# Patient Record
Sex: MALE | Race: WHITE | Employment: FULL TIME | URBAN - METROPOLITAN AREA
[De-identification: names, ages, dates, MRNs, and addresses within clinical notes are randomized per-mention and may not be internally consistent; named-entity substitution may affect disease eponyms.]

---

## 2023-07-20 ENCOUNTER — APPOINTMENT (OUTPATIENT)
Dept: PRIMARY CARE | Facility: CLINIC | Age: 48
End: 2023-07-20
Payer: COMMERCIAL

## 2023-07-20 PROBLEM — F32.A DEPRESSION: Status: ACTIVE | Noted: 2023-07-20

## 2023-07-20 PROBLEM — M79.10 MYALGIA: Status: ACTIVE | Noted: 2023-07-20

## 2023-07-20 PROBLEM — R53.83 FATIGUE: Status: ACTIVE | Noted: 2023-07-20

## 2023-07-20 PROBLEM — L30.9 ECZEMA: Status: ACTIVE | Noted: 2023-07-20

## 2023-07-20 PROBLEM — J45.909 REACTIVE AIRWAY DISEASE (HHS-HCC): Status: ACTIVE | Noted: 2023-07-20

## 2023-07-20 RX ORDER — TRIAMCINOLONE ACETONIDE 1 MG/G
CREAM TOPICAL 2 TIMES DAILY
COMMUNITY
Start: 2020-01-21

## 2023-07-20 RX ORDER — ESCITALOPRAM OXALATE 20 MG/1
1 TABLET ORAL DAILY
COMMUNITY
Start: 2020-01-21

## 2024-09-04 ENCOUNTER — OFFICE VISIT (OUTPATIENT)
Dept: UROLOGY | Facility: CLINIC | Age: 49
End: 2024-09-04
Payer: COMMERCIAL

## 2024-09-04 ENCOUNTER — LAB (OUTPATIENT)
Dept: LAB | Facility: LAB | Age: 49
End: 2024-09-04
Payer: COMMERCIAL

## 2024-09-04 VITALS
HEART RATE: 57 BPM | BODY MASS INDEX: 30.13 KG/M2 | SYSTOLIC BLOOD PRESSURE: 141 MMHG | DIASTOLIC BLOOD PRESSURE: 86 MMHG | TEMPERATURE: 97.8 F | WEIGHT: 192 LBS | HEIGHT: 67 IN

## 2024-09-04 DIAGNOSIS — R97.20 ELEVATED PSA: Primary | ICD-10-CM

## 2024-09-04 DIAGNOSIS — R97.20 ELEVATED PSA: ICD-10-CM

## 2024-09-04 DIAGNOSIS — N52.8 OTHER MALE ERECTILE DYSFUNCTION: ICD-10-CM

## 2024-09-04 DIAGNOSIS — R53.83 LOW ENERGY: ICD-10-CM

## 2024-09-04 DIAGNOSIS — Z80.42 FAMILY HISTORY OF PROSTATE CANCER: ICD-10-CM

## 2024-09-04 PROCEDURE — 99204 OFFICE O/P NEW MOD 45 MIN: CPT | Performed by: STUDENT IN AN ORGANIZED HEALTH CARE EDUCATION/TRAINING PROGRAM

## 2024-09-04 PROCEDURE — 3008F BODY MASS INDEX DOCD: CPT | Performed by: STUDENT IN AN ORGANIZED HEALTH CARE EDUCATION/TRAINING PROGRAM

## 2024-09-04 PROCEDURE — 84154 ASSAY OF PSA FREE: CPT

## 2024-09-04 PROCEDURE — 84153 ASSAY OF PSA TOTAL: CPT

## 2024-09-04 PROCEDURE — 36415 COLL VENOUS BLD VENIPUNCTURE: CPT

## 2024-09-04 PROCEDURE — 84402 ASSAY OF FREE TESTOSTERONE: CPT

## 2024-09-04 RX ORDER — SILDENAFIL 100 MG/1
100 TABLET, FILM COATED ORAL DAILY PRN
Qty: 30 TABLET | Refills: 1 | Status: SHIPPED | OUTPATIENT
Start: 2024-09-04 | End: 2024-11-03

## 2024-09-04 NOTE — PROGRESS NOTES
Scribed for Dr. Jones Farmer by Ceasar Dumont. I, Dr. Jones Farmer have personally reviewed and agreed with the information entered by the Virtual Scribe. 09/04/24.    ASSESSMENT:  Problem List Items Addressed This Visit    None  Visit Diagnoses       Elevated PSA    -  Primary    Relevant Orders    PSA, total and free    Testosterone, free, total    Family history of prostate cancer        Relevant Orders    PSA, total and free    Testosterone, free, total    Low energy        Relevant Orders    Testosterone, free, total    Other male erectile dysfunction        Relevant Medications    sildenafil (Viagra) 100 mg tablet    Other Relevant Orders    Testosterone, free, total           PLAN:  #PSA screening  Repeat a PSA and follow up to review results.   If improved, then proceed with annual PSA screening.   If no improvement, can consider prostate MRI to assess anatomy and in anticipation of biopsy.     PSA summary:  3.5 ~ Sept 2023    #Testosterone screening  Obtain testosterone studies to establish new baseline;   Since discontinued androgel.     #Erectile dysfunction  Rx for Sildenafil (Viagra)  mg provided.  Advised on all potential side effects.  Instructed to take 1 hour prior to sex on an empty stomach.  Sexual stimulation is still required in order to work.   Reviewed utilizing [employer] for discounts and affordability.  Contraindications include avoiding nitrates and not taking this medication within 4 hours of taking tamsulosin.    Discussion:  Patient was seen today with the complaint of erectile dysfunction (ED). I went over the definition of ED, which is the inability to obtain or maintain an erection sufficient for satisfactory sexual activity. I outlined that erectile function is a complex interplay of neural vascular, hormonal and psychological factors. Disruption in any of these pathways may lead to ED. In terms of treatment options; PDE5i (Viagra, Cialis, etc.), intracavernosal injections  (ICI), vacuum erection devices (ROJELIO), and penile implants (IPP) were discussed in detail.     #Tobacco abuse  We discussed smoking and tobacco cessation for >3 minutes.  I advised this is the best thing the patient can do for their health.  Advised that cessation of tobacco can improve healing following surgery.   Patient acknowledged understanding.    All questions were answered to the patient’s satisfaction.  Patient agrees with the plan and wishes to proceed.  Continue follow-up for ongoing care of his chronic medical conditions.       History of Present Illness (HPI):  Osmin presents as a new patient for an evaluation.  The patient’s EMR has been reviewed.  Lives in [place], [place] with his Wife and 10x children.   Occupation: Employed [employer].     TODAY: (24)  Reports he has been doing well overall.   Denotes a FH of prostate cancer. (Father)  States his last PSA was somewhat elevated about 1 year ago.   Therefore, decided to establish urologic care today.     From a urinary standpoint.   Denies any bothersome urinary issues.   Stream remains strong, feels he empties well.   Nocturia, 0-1x; not bothersome.   No hx of UTI's, gross hematuria, fevers or chills.   IPSS - 4.     From a sexual health standpoint.   Endorses issues with erections.  Primarily dependent on situation.   Reports having anxiety at times related to intercourse.   Which seems to exacerbate his ED.   He still has morning erections.   Issues with erections around 2/5 times.   Tried sildenafil 20 mg in the past, up to 3x times.   Achieved partial response; seems to improve erections intermittently.   States he seemed to benefit with taking Androgel in the past.   At that time, his erections seemed better and had more energy.   LUCHO - 13 (mild/moderate ED)    Testosterone screenin,236 (elevated) ~ 2020  Was on Androgel at this time.   Not currently on any testosterone therapy.     PSA summary:  3.5 ~ 2023    PMH: Low testosterone.    PSH: Hand surgery.   FH: Prostate cancer (Father; dx in 60's)  SH: Occasional smokes tobacco (pipe) and chewing tobacco.     Past Medical History:   Diagnosis Date    Other specified health status     No pertinent past medical history     Past Surgical History:   Procedure Laterality Date    OTHER SURGICAL HISTORY  09/29/2020    Hand surgery     Family History   Problem Relation Name Age of Onset    Hypertension Father      Diabetes Father       Social History     Tobacco Use   Smoking Status Not on file   Smokeless Tobacco Not on file     Current Outpatient Medications   Medication Sig Dispense Refill    escitalopram (Lexapro) 20 mg tablet Take 1 tablet (20 mg) by mouth once daily.      sildenafil (Viagra) 100 mg tablet Take 1 tablet (100 mg) by mouth once daily as needed for erectile dysfunction. 30 tablet 1    triamcinolone (Kenalog) 0.1 % cream twice a day.       No current facility-administered medications for this visit.     No Known Allergies  Past medical, surgical, family and social history in the chart was reviewed and is accurate including any additions to what is in this HPI.    REVIEW OF SYSTEMS (ROS):   Constitutional: denies any unintentional weight loss or change in strength.  Integumentary: denies any rashes or pruritus.  Eyes: denies any double vision or eye pain.  Ear/Nose/Mouth/Throat: denies any nosebleeds or gum bleeds.  Cardiovascular: denies any chest pain or syncope.  Respiratory: denies hemoptysis.  Gastrointestinal: denies nausea or vomiting.  Musculoskeletal: denies muscle cramping or weakness.  Neurologic: denies convulsions or seizures.  Hematologic/Lymphatic: denies bleeding tendencies.  Endocrine: denies heat/cold intolerance.  All other systems have been reviewed and are negative unless otherwise noted in the HPI.     OBJECTIVE:  Visit Vitals  /86 (BP Location: Left arm, Patient Position: Sitting, BP Cuff Size: Adult)   Pulse 57   Temp 36.6 °C (97.8 °F) (Temporal)      PHYSICAL EXAM:  Constitutional: No obvious distress.  Eyes: Non-injected conjunctiva, sclera clear, EOMI.  Ears/Nose/Mouth/Throat: No obvious drainage per ears or nose.  Cardiovascular: Extremities are warm and well perfused. No edema, cyanosis or pallor.  Respiratory: No audible wheezing/stridor; respirations do not appear labored.  Gastrointestinal: Abdomen soft, not distended.  Musculoskeletal: Normal ROM of extremities.  Skin: No obvious rashes or open sores.  Neurologic: Alert and oriented, CN 2-12 grossly intact.  Psychiatric: Answers questions appropriately with normal affect.  Hematologic/Lymphatic/Immunologic: No obvious bruises or sites of spontaneous bleeding.  Genitourinary: No CVA tenderness, bladder not palpable.     LABS & IMAGING:  Lab Results   Component Value Date    WBC 6.5 08/26/2020    HGB 15.1 12/23/2020    HCT 45.6 12/23/2020     08/26/2020    ALT 18 01/21/2020    AST 18 01/21/2020     01/21/2020    K 4.3 01/21/2020     01/21/2020    CREATININE 1.16 01/21/2020    BUN 20 01/21/2020    CO2 27 01/21/2020    TSH 0.87 01/21/2020     Scribed for Dr. Jones Farmer by Ceasar Dumont.  I, Dr. Jones Farmer have personally reviewed and agreed with the information entered by the Virtual Scribe. 09/04/24.

## 2024-09-06 LAB
PSA FREE MFR SERPL: 21 %
PSA FREE SERPL-MCNC: 0.4 NG/ML
PSA SERPL IA-MCNC: 1.9 NG/ML (ref 0–4)

## 2024-09-08 LAB
TESTOSTERONE FREE (CHAN): 64.5 PG/ML (ref 35–155)
TESTOSTERONE,TOTAL,LC-MS/MS: 408 NG/DL (ref 250–1100)

## 2024-10-09 ENCOUNTER — APPOINTMENT (OUTPATIENT)
Dept: UROLOGY | Facility: CLINIC | Age: 49
End: 2024-10-09
Payer: COMMERCIAL

## 2024-10-09 VITALS
WEIGHT: 199.6 LBS | DIASTOLIC BLOOD PRESSURE: 79 MMHG | BODY MASS INDEX: 31.26 KG/M2 | TEMPERATURE: 97.8 F | SYSTOLIC BLOOD PRESSURE: 122 MMHG | HEART RATE: 56 BPM

## 2024-10-09 DIAGNOSIS — R97.20 ELEVATED PSA: ICD-10-CM

## 2024-10-09 LAB
POC APPEARANCE, URINE: CLEAR
POC BILIRUBIN, URINE: NEGATIVE
POC BLOOD, URINE: NEGATIVE
POC COLOR, URINE: YELLOW
POC GLUCOSE, URINE: NEGATIVE MG/DL
POC KETONES, URINE: NEGATIVE MG/DL
POC LEUKOCYTES, URINE: NEGATIVE
POC NITRITE,URINE: NEGATIVE
POC PH, URINE: 6.5 PH
POC PROTEIN, URINE: NEGATIVE MG/DL
POC SPECIFIC GRAVITY, URINE: 1.01
POC UROBILINOGEN, URINE: 0.2 EU/DL

## 2024-10-09 PROCEDURE — 99214 OFFICE O/P EST MOD 30 MIN: CPT | Performed by: STUDENT IN AN ORGANIZED HEALTH CARE EDUCATION/TRAINING PROGRAM

## 2024-10-09 PROCEDURE — 81003 URINALYSIS AUTO W/O SCOPE: CPT | Performed by: STUDENT IN AN ORGANIZED HEALTH CARE EDUCATION/TRAINING PROGRAM

## 2024-10-09 NOTE — PROGRESS NOTES
Scribed for Dr. Jones Farmer by Ceasar Dumont. I, Dr. Jones Farmer have personally reviewed and agreed with the information entered by the Virtual Scribe. 10/09/24.    ASSESSMENT:  Problem List Items Addressed This Visit    None  Visit Diagnoses       Elevated PSA        Relevant Orders    POCT UA Automated manually resulted (Completed)           PLAN:  #PSA screening  PSA improved compared to previous.   Continue annual PSA screening.   RTC in 1 year to review results.     PSA summary:  1.8 ~ Sept 2024  3.5 ~ Sept 2023    #Testosterone screening  Applied Androgel in the past, since discontinued.   Obtained testosterone studies to establish new baseline;   I do not recommend restarting TRT at this time.   Reports poor motivation and mood. Reports difficulty with intimacy following his divorce. I placed a referral Dr. Iyer to discuss.     Testosterone: 408    #Erectile dysfunction  Continues sildenafil PRN for treatment.   Reports a good response, no side-effects.     Advised on all potential side effects.  Instructed to take 1 hour prior to sex on an empty stomach.  Sexual stimulation is still required in order to work.   Reviewed utilizing [employer] for discounts and affordability.  Contraindications include avoiding nitrates and not taking this medication within 4 hours of taking tamsulosin.    Discussion:  Patient was seen today with the complaint of erectile dysfunction (ED). I went over the definition of ED, which is the inability to obtain or maintain an erection sufficient for satisfactory sexual activity. I outlined that erectile function is a complex interplay of neural vascular, hormonal and psychological factors. Disruption in any of these pathways may lead to ED. In terms of treatment options; PDE5i (Viagra, Cialis, etc.), intracavernosal injections (ICI), vacuum erection devices (ROJELIO), and penile implants (IPP) were discussed in detail.     #Tobacco abuse  We discussed smoking and tobacco cessation for  >3 minutes.  I advised this is the best thing the patient can do for their health.  Reports he is cutting back  Patient acknowledged understanding.    All questions were answered to the patient’s satisfaction.  Patient agrees with the plan and wishes to proceed.  Continue follow-up for ongoing care of his chronic medical conditions.       History of Present Illness (HPI):  Osmin presents for a follow up visit.   The patient’s EMR has been reviewed.  Lives in [place], [place] with his Wife and 10x children.   Occupation: Employed by [employer].     TODAY: (10/09/24)  Presents for follow up, PSA and testosterone results.   Latest lab work reviewed with patient, provided reassurance.   PSA much improved compared to prior.   Testosterone is within normal limits.     He expressed persistent issues with mood, and general fatigue.   States he has lost motivation to be active; libido remains intact.   Acknowledges multiple personal stressors.   States he is more stressed due to familial-related issues.   As well as because his Father and Brother .     TO REVIEW: Initial visit (24)  Reports he has been doing well overall.   Denotes a FH of prostate cancer. (Father)  States his last PSA was somewhat elevated about 1 year ago.   Therefore, decided to establish urologic care today.     From a urinary standpoint.   Denies any bothersome urinary issues.   Stream remains strong, feels he empties well.   Nocturia, 0-1x; not bothersome.   No hx of UTI's, gross hematuria, fevers or chills.   IPSS - 4.     From a sexual health standpoint.   Endorses issues with erections.  Primarily dependent on situation.   Reports having anxiety at times related to intercourse.   Which seems to exacerbate his ED.   He still has morning erections.   Issues with erections around 2/5 times.   Tried sildenafil 20 mg in the past, up to 3x times.   Achieved partial response; seems to improve erections intermittently.   States he seemed to benefit with taking  Androgel in the past.   At that time, his erections seemed better and had more energy.   LUCHO - 13 (mild/moderate ED)    Testosterone screenin,236 (elevated) ~ 2020  Was on Androgel at this time.   Not currently on any testosterone therapy.     PSA summary:  3.5 ~ 2023    PMH: Low testosterone.   PSH: Hand surgery.   FH: Prostate cancer (Father; dx in 60's)  SH: Occasional smokes tobacco (pipe) and chewing tobacco.     Past Medical History:   Diagnosis Date    Other specified health status     No pertinent past medical history     Past Surgical History:   Procedure Laterality Date    OTHER SURGICAL HISTORY  2020    Hand surgery     Family History   Problem Relation Name Age of Onset    Hypertension Father      Diabetes Father       Social History     Tobacco Use   Smoking Status Some Days    Types: Cigarettes   Smokeless Tobacco Current     Current Outpatient Medications   Medication Sig Dispense Refill    sildenafil (Viagra) 100 mg tablet Take 1 tablet (100 mg) by mouth once daily as needed for erectile dysfunction. 30 tablet 1    escitalopram (Lexapro) 20 mg tablet Take 1 tablet (20 mg) by mouth once daily.      triamcinolone (Kenalog) 0.1 % cream twice a day.       No current facility-administered medications for this visit.     No Known Allergies  Past medical, surgical, family and social history in the chart was reviewed and is accurate including any additions to what is in this HPI.    REVIEW OF SYSTEMS (ROS):   Constitutional: denies any unintentional weight loss or change in strength.  Integumentary: denies any rashes or pruritus.  Eyes: denies any double vision or eye pain.  Ear/Nose/Mouth/Throat: denies any nosebleeds or gum bleeds.  Cardiovascular: denies any chest pain or syncope.  Respiratory: denies hemoptysis.  Gastrointestinal: denies nausea or vomiting.  Musculoskeletal: denies muscle cramping or weakness.  Neurologic: denies convulsions or seizures.  Hematologic/Lymphatic:  denies bleeding tendencies.  Endocrine: denies heat/cold intolerance.  All other systems have been reviewed and are negative unless otherwise noted in the HPI.     OBJECTIVE:  Visit Vitals  /79   Pulse 56   Temp 36.6 °C (97.8 °F) (Temporal)     PHYSICAL EXAM:  Constitutional: No obvious distress.  Eyes: Non-injected conjunctiva, sclera clear, EOMI.  Ears/Nose/Mouth/Throat: No obvious drainage per ears or nose.  Cardiovascular: Extremities are warm and well perfused. No edema, cyanosis or pallor.  Respiratory: No audible wheezing/stridor; respirations do not appear labored.  Gastrointestinal: Abdomen soft, not distended.  Musculoskeletal: Normal ROM of extremities.  Skin: No obvious rashes or open sores.  Neurologic: Alert and oriented, CN 2-12 grossly intact.  Psychiatric: Answers questions appropriately with normal affect.  Hematologic/Lymphatic/Immunologic: No obvious bruises or sites of spontaneous bleeding.  Genitourinary: No CVA tenderness, bladder not palpable.     LABS & IMAGING:  Lab Results   Component Value Date    WBC 6.5 08/26/2020    HGB 15.1 12/23/2020    HCT 45.6 12/23/2020     08/26/2020    ALT 18 01/21/2020    AST 18 01/21/2020     01/21/2020    K 4.3 01/21/2020     01/21/2020    CREATININE 1.16 01/21/2020    BUN 20 01/21/2020    CO2 27 01/21/2020    TSH 0.87 01/21/2020    PSA 1.9 09/04/2024     Scribed for Dr. Jones Farmer by Ceasar Dumont.  I, Dr. Jones Farmer have personally reviewed and agreed with the information entered by the Virtual Scribe. 10/09/24.

## 2025-03-05 ENCOUNTER — OFFICE VISIT (OUTPATIENT)
Dept: UROLOGY | Facility: HOSPITAL | Age: 50
End: 2025-03-05
Payer: COMMERCIAL

## 2025-03-05 DIAGNOSIS — N52.8 OTHER MALE ERECTILE DYSFUNCTION: Primary | ICD-10-CM

## 2025-03-05 DIAGNOSIS — R53.83 LOW ENERGY: ICD-10-CM

## 2025-03-05 DIAGNOSIS — E29.1 TESTOSTERONE DEFICIENCY IN MALE: ICD-10-CM

## 2025-03-05 PROCEDURE — G2211 COMPLEX E/M VISIT ADD ON: HCPCS | Performed by: UROLOGY

## 2025-03-05 PROCEDURE — 99214 OFFICE O/P EST MOD 30 MIN: CPT | Performed by: UROLOGY

## 2025-03-05 PROCEDURE — 99204 OFFICE O/P NEW MOD 45 MIN: CPT | Performed by: UROLOGY

## 2025-03-05 NOTE — PROGRESS NOTES
HPI    50 y.o. male being seen with the following problem list:    Problem list:  Hypogonadism  ED - Sildenafil 100mg    9/29/20 - He was put on Androgel back in 1/2020 50mg, and his dosage has recently been reduced to 25mg in May. He still not having much success with erections. SAme issues with intercourse and masturbation. He is able to get an erection but not as rigid as it used to be and doesn't last as long as it used to. Energy levels for the most part are low. He reports issues with energy and inability to lose weight. He overall felt better back in May when his levels were higher     No smoking history. He is on Lexapro. He reports high stress levels at work.      He does note that when he takes [place] T-booster he feels better. His energy was better back in May when he was at a slightly higher dose.      He has 10 children and is not interested in having more children.     03/05/25 - Not on any TRT. Currently feeling terrible, low energy, fatigue. Has been on OTC T supplements, ineffective. Has been working on diet, exercise, weight loss, sleep, but is still not able to feel much better. Felt better on low dose TRT    T, 9/2024: 408 (64.5 free T)   T, 12/2020: 1236  T, 8/2020: 321 (67 free T)  T, 5/2020: 674 (146 free T)  T, 1/2020: 224    Lab Results   Component Value Date    PSA 1.9 09/04/2024              Current Medications:  Current Outpatient Medications   Medication Sig Dispense Refill    escitalopram (Lexapro) 20 mg tablet Take 1 tablet (20 mg) by mouth once daily.      sildenafil (Viagra) 100 mg tablet Take 1 tablet (100 mg) by mouth once daily as needed for erectile dysfunction. 30 tablet 1    triamcinolone (Kenalog) 0.1 % cream twice a day.       No current facility-administered medications for this visit.        Active Problems:  Osmin Leslie is a 50 y.o. male with the following Problems and Medications.  Patient Active Problem List   Diagnosis    Depression    Eczema    Fatigue    Myalgia     Reactive airway disease ([facility])     Current Outpatient Medications   Medication Sig Dispense Refill    escitalopram (Lexapro) 20 mg tablet Take 1 tablet (20 mg) by mouth once daily.      sildenafil (Viagra) 100 mg tablet Take 1 tablet (100 mg) by mouth once daily as needed for erectile dysfunction. 30 tablet 1    triamcinolone (Kenalog) 0.1 % cream twice a day.       No current facility-administered medications for this visit.       PMH:  Past Medical History:   Diagnosis Date    Other specified health status     No pertinent past medical history       PSH:  Past Surgical History:   Procedure Laterality Date    OTHER SURGICAL HISTORY  09/29/2020    Hand surgery       FMH:  Family History   Problem Relation Name Age of Onset    Hypertension Father      Diabetes Father         SHx:  Social History     Tobacco Use    Smoking status: Some Days     Types: Cigarettes    Smokeless tobacco: Current       Allergies:  No Known Allergies      Assessment/Plan  Low-normal T from 9/2024, but continue to be symptomatic; low energy, fatigue, feels terrible. I recommend re-checking labs; T, Estrogen, FSH, LH. If still in the low-normal category, reasonable to treat with low dose TRT and see how he feels, would monitor T levels to make sure he isnt getting too high.     FU in 2 weeks with labs prior, advise to get this before 9 am.     Scribe Attestation  By signing my name below, I, Rajan Jurado, attest that this documentation has been prepared under the direction and in the presence of Herman Nguyen MD.

## 2025-03-06 ENCOUNTER — OFFICE VISIT (OUTPATIENT)
Dept: URGENT CARE | Age: 50
End: 2025-03-06
Payer: COMMERCIAL

## 2025-03-06 VITALS
DIASTOLIC BLOOD PRESSURE: 79 MMHG | OXYGEN SATURATION: 99 % | RESPIRATION RATE: 14 BRPM | TEMPERATURE: 97.6 F | SYSTOLIC BLOOD PRESSURE: 134 MMHG | HEART RATE: 59 BPM

## 2025-03-06 DIAGNOSIS — J06.9 UPPER RESPIRATORY TRACT INFECTION, UNSPECIFIED TYPE: Primary | ICD-10-CM

## 2025-03-06 PROCEDURE — 99203 OFFICE O/P NEW LOW 30 MIN: CPT | Performed by: FAMILY MEDICINE

## 2025-03-06 RX ORDER — IPRATROPIUM BROMIDE 42 UG/1
2 SPRAY, METERED NASAL 3 TIMES DAILY
Qty: 15 ML | Refills: 0 | Status: SHIPPED | OUTPATIENT
Start: 2025-03-06 | End: 2025-03-11

## 2025-03-06 RX ORDER — LORATADINE AND PSEUDOEPHEDRINE SULFATE 5; 120 MG/1; MG/1
1 TABLET, EXTENDED RELEASE ORAL 2 TIMES DAILY
Qty: 20 TABLET | Refills: 0 | Status: SHIPPED | OUTPATIENT
Start: 2025-03-06 | End: 2026-03-06

## 2025-03-06 RX ORDER — AMOXICILLIN AND CLAVULANATE POTASSIUM 875; 125 MG/1; MG/1
875 TABLET, FILM COATED ORAL 2 TIMES DAILY
Qty: 20 TABLET | Refills: 0 | Status: SHIPPED | OUTPATIENT
Start: 2025-03-06

## 2025-03-06 ASSESSMENT — ENCOUNTER SYMPTOMS: SINUS COMPLAINT: 1

## 2025-03-06 NOTE — LETTER
March 6, 2025     Patient: Osmin Leslie   YOB: 1975   Date of Visit: 3/6/2025       To Whom It May Concern:    Osmin Leslie was seen in my clinic on 3/6/2025 at 8:50 am. Please excuse Osmin for his absence from work on this day to make the appointment.    If you have any questions or concerns, please don't hesitate to call.         Sincerely,         Michael Bonner MD        CC: No Recipients

## 2025-03-06 NOTE — PROGRESS NOTES
Subjective   Patient ID: Osmin Leslie is a 50 y.o. male. They present today with a chief complaint of Sinus Problem and Ear Problem.    History of Present Illness  50-year-old male presents with 1 week of stuffy nose runny nose sore throat.  Dry cough is just worse in the morning, clears up as the day goes on.  No fever, chills, some malaise.  Not right ear pain.  No nausea, vomit, diarrhea, no rash, has not been on any medicines to treat his symptoms.  one of his daughters and ex-girlfriend are sick with similar symptoms as well      Sinus Problem      Past Medical History  Allergies as of 03/06/2025    (No Known Allergies)       (Not in a hospital admission)       Past Medical History:   Diagnosis Date    Other specified health status     No pertinent past medical history       Past Surgical History:   Procedure Laterality Date    OTHER SURGICAL HISTORY  09/29/2020    Hand surgery        reports that he has been smoking cigarettes. He uses smokeless tobacco.    Review of Systems  Review of Systems                               Objective    Vitals:    03/06/25 0838   BP: 134/79   Pulse: 59   Resp: 14   Temp: 36.4 °C (97.6 °F)   SpO2: 99%     No LMP for male patient.    Physical Exam    General- No apparent distress, well appearing  Cardiovascular- regular rate and rhythm, no gallops, murmurs or rubs  Lungs- clear to auscultation bilaterally, no wheezing or rhonchi  ENT- Normal bilateral tympanic membranes, normal pharynx  Skin- no rashes noted      Procedures    Point of Care Test & Imaging Results from this visit  No results found for this visit on 03/06/25.   No results found.    Diagnostic study results (if any) were reviewed by Michael Bonner MD.    Assessment/Plan   Allergies, medications, history, and pertinent labs/EKGs/Imaging reviewed by Michael Bonner MD.     Medical Decision Making    1.  Congestion- suspect simple URI.  Advised Claritin-D and ipratropium nasal spray.  If not improved in 48 to 72  hours start Augmentin for possible evolution to bacterial sinusitis    Orders and Diagnoses  There are no diagnoses linked to this encounter.    Medical Admin Record      Patient disposition: Home    Electronically signed by Michael Bonner MD  8:47 AM

## 2025-03-07 LAB
ESTRADIOL SERPL-MCNC: 22 PG/ML
FSH SERPL-ACNC: 8 MIU/ML (ref 1.4–12.8)
LH SERPL-ACNC: 3.2 MIU/ML (ref 1.5–9.3)
PROLACTIN SERPL-MCNC: 8.2 NG/ML (ref 2–18)
TESTOST SERPL-MCNC: 269 NG/DL (ref 250–827)

## 2025-03-20 NOTE — PROGRESS NOTES
HPI    50 y.o. male being seen with the following problem list:    Problem list:  Hypogonadism  ED - Sildenafil 100mg     9/29/20 - He was put on Androgel back in 1/2020 50mg, and his dosage has recently been reduced to 25mg in May. He still not having much success with erections. SAme issues with intercourse and masturbation. He is able to get an erection but not as rigid as it used to be and doesn't last as long as it used to. Energy levels for the most part are low. He reports issues with energy and inability to lose weight. He overall felt better back in May when his levels were higher     No smoking history. He is on Lexapro. He reports high stress levels at work.      He does note that when he takes [place] T-booster he feels better. His energy was better back in May when he was at a slightly higher dose.      He has 10 children and is not interested in having more children.      03/05/25 - Not on any TRT. Currently feeling terrible, low energy, fatigue. Has been on OTC T supplements, ineffective. Has been working on diet, exercise, weight loss, sleep, but is still not able to feel much better. Felt better on low dose TRT     T, 9/2024: 408 (64.5 free T)   T, 12/2020: 1236  T, 8/2020: 321 (67 free T)  T, 5/2020: 674 (146 free T)  T, 1/2020: 224    3/6/25 Labs  T - 269  EST - 22  FSH - 8.0  LH - 3.2    03/20/25 - Seen today over telehealth, performed this visit using real-time telehealth tools, including an audio/video connection between Osmin Leslie at home and Herman Nguyen MD at [facility]. Consent for telemedicine visit was obtained.       Lab Results   Component Value Date    PSA 1.9 09/04/2024              Current Medications:  Current Outpatient Medications   Medication Sig Dispense Refill    amoxicillin-pot clavulanate (Augmentin) 875-125 mg tablet Take 1 tablet (875 mg) by mouth 2 times a day. 20 tablet 0    escitalopram (Lexapro) 20 mg tablet Take 1 tablet (20 mg) by mouth once daily.  (Patient not taking: Reported on 3/6/2025)      ipratropium (Atrovent) 42 mcg (0.06 %) nasal spray Administer 2 sprays into each nostril 3 times a day for 5 days. 15 mL 0    loratadine-pseudoephedrine (Claritin-D 12 Hour) 5-120 mg 12 hr tablet Take 1 tablet by mouth 2 times a day. Do not crush, chew, or split. 20 tablet 0    sildenafil (Viagra) 100 mg tablet Take 1 tablet (100 mg) by mouth once daily as needed for erectile dysfunction. 30 tablet 1    triamcinolone (Kenalog) 0.1 % cream twice a day. (Patient not taking: Reported on 3/6/2025)       No current facility-administered medications for this visit.        Active Problems:  Osmin Leslie is a 50 y.o. male with the following Problems and Medications.  Patient Active Problem List   Diagnosis    Depression    Eczema    Fatigue    Myalgia    Reactive airway disease ([facility])     Current Outpatient Medications   Medication Sig Dispense Refill    amoxicillin-pot clavulanate (Augmentin) 875-125 mg tablet Take 1 tablet (875 mg) by mouth 2 times a day. 20 tablet 0    escitalopram (Lexapro) 20 mg tablet Take 1 tablet (20 mg) by mouth once daily. (Patient not taking: Reported on 3/6/2025)      ipratropium (Atrovent) 42 mcg (0.06 %) nasal spray Administer 2 sprays into each nostril 3 times a day for 5 days. 15 mL 0    loratadine-pseudoephedrine (Claritin-D 12 Hour) 5-120 mg 12 hr tablet Take 1 tablet by mouth 2 times a day. Do not crush, chew, or split. 20 tablet 0    sildenafil (Viagra) 100 mg tablet Take 1 tablet (100 mg) by mouth once daily as needed for erectile dysfunction. 30 tablet 1    triamcinolone (Kenalog) 0.1 % cream twice a day. (Patient not taking: Reported on 3/6/2025)       No current facility-administered medications for this visit.       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    We extensively reviewed the AUA guidelines on counseling for testosterone replacement therapy, discussed all the attendant risks and benefits as per the guideline. He would like to proceed.    Will start on Testosterone cypionate 100mg IM weekly. Will set him up with med, needles, syrings, and a teaching appt with one of our nurses in [place] to be closer to his house.    Fu 8 weeks over thv with midcycle T, hematocrit, symptom check.    Scribe Attestation  By signing my name below, I, Nils Martinez, Scribe, attest that this documentation has been prepared under the direction and in the presence of Herman Nguyen MD.

## 2025-03-21 ENCOUNTER — APPOINTMENT (OUTPATIENT)
Dept: UROLOGY | Facility: HOSPITAL | Age: 50
End: 2025-03-21
Payer: COMMERCIAL

## 2025-03-21 DIAGNOSIS — N40.1 BENIGN PROSTATIC HYPERPLASIA WITH LOWER URINARY TRACT SYMPTOMS, SYMPTOM DETAILS UNSPECIFIED: ICD-10-CM

## 2025-03-21 DIAGNOSIS — R97.20 ELEVATED PSA: ICD-10-CM

## 2025-03-21 DIAGNOSIS — E29.1 TESTOSTERONE DEFICIENCY IN MALE: Primary | ICD-10-CM

## 2025-03-21 DIAGNOSIS — E29.1 HYPOGONADISM MALE: ICD-10-CM

## 2025-03-21 PROCEDURE — 99214 OFFICE O/P EST MOD 30 MIN: CPT | Performed by: UROLOGY

## 2025-03-21 PROCEDURE — G2211 COMPLEX E/M VISIT ADD ON: HCPCS | Performed by: UROLOGY

## 2025-03-21 RX ORDER — TESTOSTERONE CYPIONATE 1000 MG/10ML
100 INJECTION, SOLUTION INTRAMUSCULAR WEEKLY
Qty: 5 ML | Refills: 4 | Status: SHIPPED | OUTPATIENT
Start: 2025-03-21 | End: 2025-09-17

## 2025-03-21 RX ORDER — SYRINGE W-NEEDLE,DISPOSAB,3 ML 25GX5/8"
1 SYRINGE, EMPTY DISPOSABLE MISCELLANEOUS WEEKLY
Qty: 16 EACH | Refills: 2 | Status: SHIPPED | OUTPATIENT
Start: 2025-03-21 | End: 2025-06-19

## 2025-04-08 ENCOUNTER — APPOINTMENT (OUTPATIENT)
Dept: UROLOGY | Facility: CLINIC | Age: 50
End: 2025-04-08
Payer: COMMERCIAL

## 2025-04-22 ENCOUNTER — OFFICE VISIT (OUTPATIENT)
Dept: URGENT CARE | Age: 50
End: 2025-04-22
Payer: COMMERCIAL

## 2025-04-22 VITALS
BODY MASS INDEX: 31.17 KG/M2 | TEMPERATURE: 99.1 F | OXYGEN SATURATION: 97 % | DIASTOLIC BLOOD PRESSURE: 88 MMHG | SYSTOLIC BLOOD PRESSURE: 137 MMHG | RESPIRATION RATE: 17 BRPM | HEART RATE: 105 BPM | WEIGHT: 199 LBS

## 2025-04-22 DIAGNOSIS — Z20.822 CLOSE EXPOSURE TO COVID-19 VIRUS: ICD-10-CM

## 2025-04-22 DIAGNOSIS — K04.7 DENTAL INFECTION: Primary | ICD-10-CM

## 2025-04-22 LAB
POC CORONAVIRUS SARS-COV-2 PCR: NEGATIVE
POC HUMAN RHINOVIRUS PCR: NEGATIVE
POC INFLUENZA A VIRUS PCR: NEGATIVE
POC INFLUENZA B VIRUS PCR: NEGATIVE
POC RESPIRATORY SYNCYTIAL VIRUS PCR: NEGATIVE

## 2025-04-22 PROCEDURE — 99214 OFFICE O/P EST MOD 30 MIN: CPT

## 2025-04-22 PROCEDURE — 87631 RESP VIRUS 3-5 TARGETS: CPT

## 2025-04-22 PROCEDURE — 99070 SPECIAL SUPPLIES PHYS/QHP: CPT

## 2025-04-22 RX ORDER — AMOXICILLIN AND CLAVULANATE POTASSIUM 875; 125 MG/1; MG/1
875 TABLET, FILM COATED ORAL 2 TIMES DAILY
Qty: 20 TABLET | Refills: 0 | Status: SHIPPED | OUTPATIENT
Start: 2025-04-22

## 2025-04-22 ASSESSMENT — ENCOUNTER SYMPTOMS
LOSS OF CONSCIOUSNESS: 0
CHILLS: 1
VOMITING: 0
FATIGUE: 0
DIARRHEA: 0
RHINORRHEA: 0
SINUS PAIN: 0
SORE THROAT: 0
COUGH: 0
ADENOPATHY: 0
WHEEZING: 0
HEADACHES: 0
SINUS PRESSURE: 0
FEVER: 0
SHORTNESS OF BREATH: 0
ABDOMINAL PAIN: 0
MYALGIAS: 1
NAUSEA: 0

## 2025-04-22 NOTE — PROGRESS NOTES
Subjective   Patient ID: Osmin Leslie is a 50 y.o. male. They present today with a chief complaint of Illness (Chills, aches x less then one day).    History of Present Illness  50 year old male presents with complaint of chills and sweats. Reports he broke a tooth last week which was painful at first. Denies cough, sinus pain, fever, wheezing, ear pain, foul taste in mouth, sore throat. Symptoms started yesterday.       Illness  Severity:  Moderate  Onset quality:  Sudden  Duration:  1 day  Timing:  Constant  Progression:  Unchanged  Worsened by:  Nothing tried  Associated symptoms: myalgias    Associated symptoms: no abdominal pain, no chest pain, no congestion, no cough, no diarrhea, no ear pain, no fatigue, no fever, no headaches, no loss of consciousness, no nausea, no rash, no rhinorrhea, no shortness of breath, no sore throat, no vomiting and no wheezing        Past Medical History  Allergies as of 04/22/2025    (No Known Allergies)       Prescriptions Prior to Admission[1]     Medical History[2]    Surgical History[3]     reports that he has been smoking cigarettes. He uses smokeless tobacco.    Review of Systems  Review of Systems   Constitutional:  Positive for chills. Negative for fatigue and fever.   HENT:  Positive for dental problem. Negative for congestion, drooling, ear pain, postnasal drip, rhinorrhea, sinus pressure, sinus pain and sore throat.    Respiratory:  Negative for cough, shortness of breath and wheezing.    Cardiovascular:  Negative for chest pain.   Gastrointestinal:  Negative for abdominal pain, diarrhea, nausea and vomiting.   Musculoskeletal:  Positive for myalgias.   Skin:  Negative for rash.   Neurological:  Negative for loss of consciousness and headaches.   Hematological:  Negative for adenopathy.   All other systems reviewed and are negative.        Objective    Vitals:    04/22/25 1748   BP: 137/88   Pulse: 105   Resp: 17   Temp: 37.3 °C (99.1 °F)   SpO2: 97%   Weight: 90.3 kg  (199 lb)     No LMP for male patient.    Physical Exam  Vitals and nursing note reviewed.   Constitutional:       General: He is not in acute distress.     Appearance: Normal appearance. He is not ill-appearing.   HENT:      Head: Normocephalic.      Right Ear: Tympanic membrane, ear canal and external ear normal.      Left Ear: Tympanic membrane, ear canal and external ear normal.      Nose: Rhinorrhea present. No congestion.      Right Sinus: No maxillary sinus tenderness or frontal sinus tenderness.      Left Sinus: No maxillary sinus tenderness or frontal sinus tenderness.      Mouth/Throat:      Mouth: Mucous membranes are moist. No oral lesions.      Dentition: Dental tenderness and dental caries present. No dental abscesses.      Pharynx: No oropharyngeal exudate, posterior oropharyngeal erythema or uvula swelling.      Tonsils: No tonsillar exudate or tonsillar abscesses.     Eyes:      Conjunctiva/sclera: Conjunctivae normal.      Pupils: Pupils are equal, round, and reactive to light.   Cardiovascular:      Rate and Rhythm: Normal rate and regular rhythm.      Pulses: Normal pulses.      Heart sounds: Normal heart sounds. No murmur heard.  Pulmonary:      Effort: Pulmonary effort is normal. No respiratory distress.      Breath sounds: Normal breath sounds.   Musculoskeletal:         General: Normal range of motion.      Cervical back: Normal range of motion and neck supple.   Skin:     General: Skin is warm and dry.   Neurological:      Mental Status: He is alert and oriented to person, place, and time.   Psychiatric:         Mood and Affect: Mood normal.         Behavior: Behavior normal.         Procedures    Point of Care Test & Imaging Results from this visit  Results for orders placed or performed in visit on 04/22/25   POCT SPOTFIRE R/ST Panel Mini w/COVID ([facility]) manually resulted    Specimen: Swab   Result Value Ref Range    POC Sars-Cov-2 PCR Negative Negative    POC Respiratory Syncytial  Virus PCR Negative Negative    POC Influenza A Virus PCR Negative Negative    POC Influenza B Virus PCR Negative Negative    POC Human Rhinovirus PCR Negative Negative      Imaging  No results found.    Cardiology, Vascular, and Other Imaging  No other imaging results found for the past 2 days      Diagnostic study results (if any) were reviewed by NATHAN Whitehead.    Assessment/Plan   Allergies, medications, history, and pertinent labs/EKGs/Imaging reviewed by NATHAN Whitehead.     Medical Decision Making  Signs and symptoms consistent with dental infection. No evidence of severe abscess requiring drainage. Symptoms and exam are NOT suggestive of PTA or Ludwigs angina. Will cover with antibiotics and patient will follow with dentist for further evaluation and care. Supportive measures for pain. Advised to present to ED if symptoms change or worsen.  Patient verbalized understanding and agreeable with plan of care.     Orders and Diagnoses  Diagnoses and all orders for this visit:  Close exposure to COVID-19 virus  -     POCT SPOTFIRE R/ST Panel Mini w/COVID ([facility]) manually resulted      Medical Admin Record      Patient disposition: Home    Electronically signed by NATHAN Whitehead  6:24 PM           [1] (Not in a hospital admission)   [2]   Past Medical History:  Diagnosis Date    Other specified health status     No pertinent past medical history   [3]   Past Surgical History:  Procedure Laterality Date    OTHER SURGICAL HISTORY  09/29/2020    Hand surgery

## 2025-05-20 LAB
HCT VFR BLD AUTO: 43.5 % (ref 38.5–50)
PSA SERPL-MCNC: 1.84 NG/ML
TESTOST SERPL-MCNC: 244 NG/DL (ref 250–827)

## 2025-05-21 ENCOUNTER — TELEMEDICINE (OUTPATIENT)
Dept: UROLOGY | Facility: HOSPITAL | Age: 50
End: 2025-05-21
Payer: COMMERCIAL

## 2025-05-21 DIAGNOSIS — E29.1 HYPOGONADISM MALE: ICD-10-CM

## 2025-05-21 DIAGNOSIS — R53.83 LOW ENERGY: Primary | ICD-10-CM

## 2025-05-21 PROCEDURE — 99214 OFFICE O/P EST MOD 30 MIN: CPT | Performed by: UROLOGY

## 2025-05-21 RX ORDER — TESTOSTERONE CYPIONATE 1000 MG/10ML
100 INJECTION, SOLUTION INTRAMUSCULAR WEEKLY
Qty: 5 ML | Refills: 4 | Status: SHIPPED | OUTPATIENT
Start: 2025-05-21 | End: 2025-11-17

## 2025-05-21 NOTE — PROGRESS NOTES
HPI    Virtual or Telephone Consent    An interactive audio and video telecommunication system which permits real time communications between the patient (at the originating site) and provider (at the distant site) was utilized to provide this telehealth service.   Verbal consent was requested and obtained from Osmin Leslie on this date, 05/21/25 for a telehealth visit and the patient's location was confirmed at the time of the visit.    50 y.o. male being seen with the following problem list:    Problem list:  Hypogonadism  ED - Sildenafil 100mg     9/29/20 - He was put on Androgel back in 1/2020 50mg, and his dosage has recently been reduced to 25mg in May. He still not having much success with erections. SAme issues with intercourse and masturbation. He is able to get an erection but not as rigid as it used to be and doesn't last as long as it used to. Energy levels for the most part are low. He reports issues with energy and inability to lose weight. He overall felt better back in May when his levels were higher     No smoking history. He is on Lexapro. He reports high stress levels at work.      He does note that when he takes [place] T-booster he feels better. His energy was better back in May when he was at a slightly higher dose.      He has 10 children and is not interested in having more children.      03/05/25 - Not on any TRT. Currently feeling terrible, low energy, fatigue. Has been on OTC T supplements, ineffective. Has been working on diet, exercise, weight loss, sleep, but is still not able to feel much better. Felt better on low dose TRT     T, 9/2024: 408 (64.5 free T)   T, 12/2020: 1236  T, 8/2020: 321 (67 free T)  T, 5/2020: 674 (146 free T)  T, 1/2020: 224     3/6/25 Labs  T - 269  EST - 22  FSH - 8.0  LH - 3.2     03/20/25 - Seen today over telehealth, performed this visit using real-time telehealth tools, including an audio/video connection between Osmin Leslie at home and Herman Nguyen MD  "at [facility]. Consent for telemedicine visit was obtained.      Lab Results from 05/19/25 - Hematocrit 43.5; Testosterone 244; PSA 1.84.     05/21/25 - the patient started testosterone shots but admits he got off schedule with the injections. Labs were done >1 week after his injection. Patient is feeling a little better. Patient has trouble maintaining an erection.     Lab Results   Component Value Date    PSA 1.84 05/19/2025    PSA 1.9 09/04/2024         Current Medications:  Current Medications[1]     Active Problems:  Osmin Leslie is a 50 y.o. male with the following Problems and Medications.  Problem List[2]  Current Medications[3]    PMH:  Medical History[4]    PSH:  Surgical History[5]    FMH:  Family History[6]    SHx:  Social History[7]    Allergies:  RX Allergies[8]      Assessment/Plan  Patient will get labs done 3 to 4 days after his injection. Will reorder testosterone cypionate. Order T level for midcycle. Follow-up in 1 month with results.         Scribe Attestation  By signing my name below, I, Kenya Soares , Scribnorma   attest that this documentation has been prepared under the direction and in the presence of Herman Nguyen MD.         [1]   Current Outpatient Medications   Medication Sig Dispense Refill    amoxicillin-clavulanate (Augmentin) 875-125 mg tablet Take 1 tablet (875 mg) by mouth 2 times a day. 20 tablet 0    escitalopram (Lexapro) 20 mg tablet Take 1 tablet (20 mg) by mouth once daily. (Patient not taking: Reported on 3/6/2025)      loratadine-pseudoephedrine (Claritin-D 12 Hour) 5-120 mg 12 hr tablet Take 1 tablet by mouth 2 times a day. Do not crush, chew, or split. 20 tablet 0    needle, disp, 18 G 18 gauge x 1 1/2\" needle Use for medication draw 16 each 3    sildenafil (Viagra) 100 mg tablet Take 1 tablet (100 mg) by mouth once daily as needed for erectile dysfunction. 30 tablet 1    syringe with needle (Carepoint Luer Lock Syr-needle) 3 mL 22 x 1 1/2\" syringe 1 each 1 " "(one) time per week. 16 each 2    testosterone cypionate (Depo-Testosterone) 100 mg/mL injection Inject 1 mL (100 mg) into the muscle 1 (one) time per week. 5 mL 4     No current facility-administered medications for this visit.   [2]   Patient Active Problem List  Diagnosis    Depression    Eczema    Fatigue    Myalgia    Reactive airway disease ([facility])   [3]   Current Outpatient Medications   Medication Sig Dispense Refill    amoxicillin-clavulanate (Augmentin) 875-125 mg tablet Take 1 tablet (875 mg) by mouth 2 times a day. 20 tablet 0    escitalopram (Lexapro) 20 mg tablet Take 1 tablet (20 mg) by mouth once daily. (Patient not taking: Reported on 3/6/2025)      loratadine-pseudoephedrine (Claritin-D 12 Hour) 5-120 mg 12 hr tablet Take 1 tablet by mouth 2 times a day. Do not crush, chew, or split. 20 tablet 0    needle, disp, 18 G 18 gauge x 1 1/2\" needle Use for medication draw 16 each 3    sildenafil (Viagra) 100 mg tablet Take 1 tablet (100 mg) by mouth once daily as needed for erectile dysfunction. 30 tablet 1    syringe with needle (Carepoint Luer Lock Syr-needle) 3 mL 22 x 1 1/2\" syringe 1 each 1 (one) time per week. 16 each 2    testosterone cypionate (Depo-Testosterone) 100 mg/mL injection Inject 1 mL (100 mg) into the muscle 1 (one) time per week. 5 mL 4     No current facility-administered medications for this visit.   [4]   Past Medical History:  Diagnosis Date    Other specified health status     No pertinent past medical history   [5]   Past Surgical History:  Procedure Laterality Date    OTHER SURGICAL HISTORY  09/29/2020    Hand surgery   [6]   Family History  Problem Relation Name Age of Onset    Hypertension Father      Diabetes Father     [7]   Social History  Tobacco Use    Smoking status: Some Days     Types: Cigarettes    Smokeless tobacco: Current   [8] No Known Allergies    "

## 2025-05-22 ENCOUNTER — APPOINTMENT (OUTPATIENT)
Dept: UROLOGY | Facility: HOSPITAL | Age: 50
End: 2025-05-22
Payer: COMMERCIAL

## 2025-06-25 LAB — TESTOST SERPL-MCNC: 819 NG/DL (ref 250–827)

## 2025-06-30 NOTE — PROGRESS NOTES
HPI    50 y.o. male being seen with the following problem list:    Problem list:  Hypogonadism  ED - Sildenafil 100mg     9/29/20 - He was put on Androgel back in 1/2020 50mg, and his dosage has recently been reduced to 25mg in May. He still not having much success with erections. SAme issues with intercourse and masturbation. He is able to get an erection but not as rigid as it used to be and doesn't last as long as it used to. Energy levels for the most part are low. He reports issues with energy and inability to lose weight. He overall felt better back in May when his levels were higher     No smoking history. He is on Lexapro. He reports high stress levels at work.      He does note that when he takes [place] T-booster he feels better. His energy was better back in May when he was at a slightly higher dose.      He has 10 children and is not interested in having more children.      03/05/25 - Not on any TRT. Currently feeling terrible, low energy, fatigue. Has been on OTC T supplements, ineffective. Has been working on diet, exercise, weight loss, sleep, but is still not able to feel much better. Felt better on low dose TRT     T, 9/2024: 408 (64.5 free T)   T, 12/2020: 1236  T, 8/2020: 321 (67 free T)  T, 5/2020: 674 (146 free T)  T, 1/2020: 224     3/6/25 Labs  T - 269  EST - 22  FSH - 8.0  LH - 3.2     03/20/25 - Seen today over telehealth, performed this visit using real-time telehealth tools, including an audio/video connection between Osmin Leslie at home and Herman Nguyen MD at [facility]. Consent for telemedicine visit was obtained.      Lab Results from 05/19/25 - Hematocrit 43.5; Testosterone 244; PSA 1.84.      05/21/25 - the patient started testosterone shots but admits he got off schedule with the injections. Labs were done >1 week after his injection. Patient is feeling a little better. Patient has trouble maintaining an erection.     6/2025 T - 819    07/02/25 - Seen today over  "telehealth, performed this visit using real-time telehealth tools, including an audio/video connection between Osmin Leslie at home and Herman Nguyen MD at [facility]. Consent for telemedicine visit was obtained.   Feeling great on TRT. Issues maintaining erection, no issues with obtaining one. Notes some new issues with acne      Lab Results   Component Value Date    PSA 1.84 05/19/2025    PSA 1.9 09/04/2024              Current Medications:  Current Medications[1]     Active Problems:  Osmin Leslie is a 50 y.o. male with the following Problems and Medications.  Problem List[2]  Current Medications[3]    PMH:  Medical History[4]    PSH:  Surgical History[5]    FMH:  Family History[6]    SHx:  Social History[7]    Allergies:  RX Allergies[8]      Assessment/Plan  Symptomatically doing well on Testosterone cypionate 100mg IM weekly, labs look great. Advise to cut down to 3/4 of ml, instead of a ml, given acne. Will re-check T, hmtcrt in 3 months. Rx refilled.     ED, issues maintaining erections. Will order low dose Cialis 5mg for him. Reviewed s/e and contraindication to nitrates.     FU in 3 months with labs prior.     Scribe Attestation  By signing my name below, I, Nils Martinez, Edwaribnorma, attest that this documentation has been prepared under the direction and in the presence of Herman Nguyen MD.           [1]   Current Outpatient Medications   Medication Sig Dispense Refill    amoxicillin-clavulanate (Augmentin) 875-125 mg tablet Take 1 tablet (875 mg) by mouth 2 times a day. 20 tablet 0    escitalopram (Lexapro) 20 mg tablet Take 1 tablet (20 mg) by mouth once daily. (Patient not taking: Reported on 3/6/2025)      loratadine-pseudoephedrine (Claritin-D 12 Hour) 5-120 mg 12 hr tablet Take 1 tablet by mouth 2 times a day. Do not crush, chew, or split. 20 tablet 0    needle, disp, 18 G 18 gauge x 1 1/2\" needle Use for medication draw 16 each 3    tadalafil (Cialis) 5 mg tablet Take 1 tablet (5 mg) by " "mouth once daily. 30 tablet 11    testosterone cypionate (Depo-Testosterone) 100 mg/mL injection Inject 1 mL (100 mg) into the muscle 1 (one) time per week. 5 mL 4     No current facility-administered medications for this visit.   [2]   Patient Active Problem List  Diagnosis    Depression    Eczema    Fatigue    Myalgia    Reactive airway disease ([facility])   [3]   Current Outpatient Medications   Medication Sig Dispense Refill    amoxicillin-clavulanate (Augmentin) 875-125 mg tablet Take 1 tablet (875 mg) by mouth 2 times a day. 20 tablet 0    escitalopram (Lexapro) 20 mg tablet Take 1 tablet (20 mg) by mouth once daily. (Patient not taking: Reported on 3/6/2025)      loratadine-pseudoephedrine (Claritin-D 12 Hour) 5-120 mg 12 hr tablet Take 1 tablet by mouth 2 times a day. Do not crush, chew, or split. 20 tablet 0    needle, disp, 18 G 18 gauge x 1 1/2\" needle Use for medication draw 16 each 3    tadalafil (Cialis) 5 mg tablet Take 1 tablet (5 mg) by mouth once daily. 30 tablet 11    testosterone cypionate (Depo-Testosterone) 100 mg/mL injection Inject 1 mL (100 mg) into the muscle 1 (one) time per week. 5 mL 4     No current facility-administered medications for this visit.   [4]   Past Medical History:  Diagnosis Date    Erectile dysfunction     Other specified health status     No pertinent past medical history   [5]   Past Surgical History:  Procedure Laterality Date    OTHER SURGICAL HISTORY  09/29/2020    Hand surgery   [6]   Family History  Problem Relation Name Age of Onset    Hypertension Father      Diabetes Father      Prostate cancer Father's Brother Tutu Leslie Jr     Prostate cancer Father's Brother Tutu Leslie Jr     Prostate cancer Father's Brother Tutu Lelsie Jr    [7]   Social History  Tobacco Use    Smoking status: Every Day     Types: Cigarettes, Pipe    Smokeless tobacco: Current     Types: Chew    Tobacco comments:     Began chewing again during divorce   Substance Use Topics    Alcohol " use: Not Currently     Comment: 1 or 2 very rarely    Drug use: Never   [8] No Known Allergies

## 2025-07-02 ENCOUNTER — TELEMEDICINE (OUTPATIENT)
Dept: UROLOGY | Facility: HOSPITAL | Age: 50
End: 2025-07-02
Payer: COMMERCIAL

## 2025-07-02 DIAGNOSIS — E29.1 HYPOGONADISM MALE: ICD-10-CM

## 2025-07-02 DIAGNOSIS — N52.8 OTHER MALE ERECTILE DYSFUNCTION: ICD-10-CM

## 2025-07-02 PROCEDURE — 99214 OFFICE O/P EST MOD 30 MIN: CPT | Performed by: UROLOGY

## 2025-07-02 RX ORDER — TADALAFIL 5 MG/1
5 TABLET ORAL DAILY
Qty: 30 TABLET | Refills: 11 | Status: SHIPPED | OUTPATIENT
Start: 2025-07-02 | End: 2026-07-02

## 2025-07-02 RX ORDER — TADALAFIL 5 MG/1
5 TABLET ORAL DAILY
Qty: 30 TABLET | Refills: 11 | Status: SHIPPED | OUTPATIENT
Start: 2025-07-02 | End: 2025-07-02 | Stop reason: SDUPTHER

## 2025-07-02 RX ORDER — TESTOSTERONE CYPIONATE 1000 MG/10ML
100 INJECTION, SOLUTION INTRAMUSCULAR WEEKLY
Qty: 5 ML | Refills: 4 | Status: SHIPPED | OUTPATIENT
Start: 2025-07-02 | End: 2025-12-29

## 2025-07-10 DIAGNOSIS — E29.1 HYPOGONADISM MALE: ICD-10-CM

## 2025-07-10 RX ORDER — TESTOSTERONE CYPIONATE 200 MG/ML
100 INJECTION, SOLUTION INTRAMUSCULAR WEEKLY
Qty: 4 ML | Refills: 3 | Status: SHIPPED | OUTPATIENT
Start: 2025-07-10 | End: 2025-10-08

## 2025-08-02 DIAGNOSIS — E29.1 HYPOGONADISM MALE: ICD-10-CM
